# Patient Record
Sex: MALE | Race: WHITE | NOT HISPANIC OR LATINO | Employment: FULL TIME | URBAN - NONMETROPOLITAN AREA
[De-identification: names, ages, dates, MRNs, and addresses within clinical notes are randomized per-mention and may not be internally consistent; named-entity substitution may affect disease eponyms.]

---

## 2022-12-29 ENCOUNTER — HOSPITAL ENCOUNTER (EMERGENCY)
Facility: HOSPITAL | Age: 27
Discharge: HOME | End: 2022-12-29
Attending: EMERGENCY MEDICINE | Admitting: EMERGENCY MEDICINE
Payer: COMMERCIAL

## 2022-12-29 VITALS
HEIGHT: 72 IN | WEIGHT: 270 LBS | BODY MASS INDEX: 36.57 KG/M2 | DIASTOLIC BLOOD PRESSURE: 93 MMHG | RESPIRATION RATE: 18 BRPM | OXYGEN SATURATION: 96 % | TEMPERATURE: 98.6 F | SYSTOLIC BLOOD PRESSURE: 150 MMHG | HEART RATE: 105 BPM

## 2022-12-29 DIAGNOSIS — R09.81 NASAL CONGESTION: ICD-10-CM

## 2022-12-29 DIAGNOSIS — R05.9 COUGH, UNSPECIFIED TYPE: Primary | ICD-10-CM

## 2022-12-29 DIAGNOSIS — J34.89 RHINORRHEA: ICD-10-CM

## 2022-12-29 LAB
FLUAV RNA RESP QL NAA+PROBE: NOT DETECTED
FLUBV RNA RESP QL NAA+PROBE: NOT DETECTED
S PYO AG THROAT QL: NEGATIVE
SARS-COV-2 RNA RESP QL NAA+PROBE: NOT DETECTED

## 2022-12-29 PROCEDURE — 87081 CULTURE SCREEN ONLY: CPT

## 2022-12-29 PROCEDURE — 87880 STREP A ASSAY W/OPTIC: CPT

## 2022-12-29 PROCEDURE — 87636 SARSCOV2 & INF A&B AMP PRB: CPT | Performed by: EMERGENCY MEDICINE

## 2022-12-29 PROCEDURE — 99283 EMERGENCY DEPT VISIT LOW MDM: CPT

## 2022-12-29 RX ORDER — BENZONATATE 100 MG/1
100 CAPSULE ORAL 3 TIMES DAILY PRN
Qty: 15 CAPSULE | Refills: 0 | Status: SHIPPED | OUTPATIENT
Start: 2022-12-29

## 2022-12-29 RX ORDER — ACETAMINOPHEN 325 MG/1
975 TABLET ORAL ONCE
Status: COMPLETED | OUTPATIENT
Start: 2022-12-29 | End: 2022-12-29

## 2022-12-29 RX ORDER — FLUTICASONE PROPIONATE 50 MCG
2 SPRAY, SUSPENSION (ML) NASAL DAILY
Qty: 9.9 ML | Refills: 0 | Status: SHIPPED | OUTPATIENT
Start: 2022-12-29 | End: 2022-12-29 | Stop reason: SDUPTHER

## 2022-12-29 RX ORDER — FLUTICASONE PROPIONATE 50 MCG
2 SPRAY, SUSPENSION (ML) NASAL DAILY
Qty: 9.9 ML | Refills: 0 | Status: SHIPPED | OUTPATIENT
Start: 2022-12-29

## 2022-12-29 RX ORDER — BENZONATATE 100 MG/1
100 CAPSULE ORAL 3 TIMES DAILY PRN
Qty: 15 CAPSULE | Refills: 0 | Status: SHIPPED | OUTPATIENT
Start: 2022-12-29 | End: 2022-12-29 | Stop reason: SDUPTHER

## 2022-12-29 RX ADMIN — ACETAMINOPHEN 975 MG: 325 TABLET, FILM COATED ORAL at 14:31

## 2022-12-29 NOTE — ED PROVIDER NOTES
Subjective  History of Present Illness:    Chief Complaint: Flu symptoms  History of Present Illness: This is a 27-year-old male who presents emergency room today with chief complaint of flulike symptoms.  He reports cough that is dry in nature.  Congestion, runny nose, sore throat, mild headaches.  He reports he still has a mild headache at this time generalized to the lateral sides of his head, however not worse headache of his life and has been gradual onset nature.  Nonthunderclap-like in nature.  He additionally reports some decreased appetite.  He is maintaining good urinary output.  He additionally reports some right ear congestion and feels like there is some fluid in there.  His symptoms started on 25 December, he has not taken any medications such as Mucinex for improvement of symptoms.  He did take some ibuprofen at [place].  No known sick exposures.  Not vaccinated for flu or COVID.  Has not seen an urgent care or primary care prior to arrival here.        Nurses Notes reviewed and agree, including vitals, allergies, social history and prior medical history.     REVIEW OF SYSTEMS: All systems reviewed and not pertinent unless noted.    Review of Systems   Constitutional: Positive for fever.   HENT: Positive for congestion, rhinorrhea and sore throat.         Ear congestion in the right ear   Respiratory: Positive for cough. Negative for shortness of breath.    Gastrointestinal: Negative for nausea and vomiting.   Neurological: Positive for headaches.   All other systems reviewed and are negative.      Past Medical History:   Diagnosis Date   • Asthma        Allergies:    Patient has no known allergies.      History reviewed. No pertinent surgical history.      Social History     Socioeconomic History   • Marital status: Single   Tobacco Use   • Smoking status: Never     Passive exposure: Never   • Smokeless tobacco: Never   Vaping Use   • Vaping Use: Never used   Substance and Sexual Activity   •  Alcohol use: Not Currently   • Drug use: Never   • Sexual activity: Defer         History reviewed. No pertinent family history.    Objective  Physical Exam:  /93 (BP Location: Left arm, Patient Position: Sitting)   Pulse 105   Temp 98.6 °F (37 °C) (Oral)   Resp 18   Ht 182.9 cm (72\")   Wt 122 kg (270 lb)   SpO2 96%   BMI 36.62 kg/m²      Physical Exam  Constitutional:       General: He is not in acute distress.     Appearance: Normal appearance. He is normal weight. He is not ill-appearing, toxic-appearing or diaphoretic.   HENT:      Head: Normocephalic and atraumatic.      Right Ear: Ear canal and external ear normal. There is impacted cerumen.      Left Ear: Ear canal and external ear normal. There is impacted cerumen.      Ears:      Comments: From what I can visualize of the right tympanic membrane there appears to be some fluid behind the ear drum, nonbulging nonerythematous.  Left TM from what can be visualized also nonerythematous and nonbulging.     Nose: Congestion present. No rhinorrhea.      Mouth/Throat:      Pharynx: Oropharynx is clear. Posterior oropharyngeal erythema present.      Comments: Mild posterior oropharynx erythema, small white specks on right tonsil.  Uvula is midline.  No concern for peritonsillar abscess.  Tonsils are normal in size.  Eyes:      General:         Right eye: No discharge.         Left eye: No discharge.      Extraocular Movements: Extraocular movements intact.      Conjunctiva/sclera: Conjunctivae normal.   Cardiovascular:      Rate and Rhythm: Normal rate and regular rhythm.      Pulses: Normal pulses.      Heart sounds: Normal heart sounds.   Pulmonary:      Effort: Pulmonary effort is normal. No respiratory distress.      Breath sounds: Normal breath sounds. No stridor. No wheezing, rhonchi or rales.   Abdominal:      General: There is no distension.      Palpations: Abdomen is soft.      Tenderness: There is no abdominal tenderness. There is no guarding.    Musculoskeletal:         General: No swelling. Normal range of motion.      Cervical back: Normal range of motion and neck supple.   Lymphadenopathy:      Cervical: No cervical adenopathy.   Skin:     General: Skin is warm and dry.      Capillary Refill: Capillary refill takes less than 2 seconds.      Coloration: Skin is not jaundiced or pale.   Neurological:      General: No focal deficit present.      Mental Status: He is alert and oriented to person, place, and time.   Psychiatric:         Mood and Affect: Mood normal.         Behavior: Behavior normal.         Thought Content: Thought content normal.         Judgment: Judgment normal.           Procedures    ED Course:    ED Course as of 12/29/22 1413   Thu Dec 29, 2022   1254 We will obtain a rapid strep, COVID, flu.  He is hemodynamically stable, afebrile.  Satting 96% on room air. [JR]   1325 COVID, flu, strep negative.  Strep will be cultured.  Highly suspicious for viral etiology of his symptoms.  He is hemodynamically stable, afebrile, safe for discharge home at this time.  Will return for any worsening of symptoms. [JR]      ED Course User Index  [JR] Jair Daley PA-C       Lab Results (last 24 hours)     Procedure Component Value Units Date/Time    COVID PRE-OP / PRE-PROCEDURE SCREENING ORDER (NO ISOLATION) - Swab, Nasopharynx [[id number]]  (Normal) Collected: 12/29/22 1259    Specimen: Swab from Nasopharynx Updated: 12/29/22 1350    Narrative:      The following orders were created for panel order COVID PRE-OP / PRE-PROCEDURE SCREENING ORDER (NO ISOLATION) - Swab, Nasopharynx.  Procedure                               Abnormality         Status                     ---------                               -----------         ------                     COVID-19 and FLU A/B PCR...[[id number]]  Normal              Final result                 Please view results for these tests on the individual orders.    COVID-19 and FLU A/B PCR - Swab, Nasopharynx  [[id number]]  (Normal) Collected: 12/29/22 1259    Specimen: Swab from Nasopharynx Updated: 12/29/22 1350     COVID19 Not Detected     Influenza A PCR Not Detected     Influenza B PCR Not Detected    Narrative:      Fact sheet for providers: https://www.fda.gov/media/185478/download    Fact sheet for patients: https://www.fda.gov/media/103413/download    Test performed by PCR.    Rapid Strep A Screen - Swab, Throat [[id number]]  (Normal) Collected: 12/29/22 1329    Specimen: Swab from Throat Updated: 12/29/22 1348     Strep A Ag Negative    Beta Strep Culture, Throat - Swab, Throat [[id number]] Collected: 12/29/22 1329    Specimen: Swab from Throat Updated: 12/29/22 1348           No radiology results from the last 24 hrs       MDM  Number of Diagnoses or Management Options     Amount and/or Complexity of Data Reviewed  Clinical lab tests: reviewed and ordered  Discuss the patient with other providers: yes    Risk of Complications, Morbidity, and/or Mortality  General comments: This is a 27-year-old male who presents emergency room today with chief complaint of flulike symptoms.  He reports cough that is dry in nature.  Congestion, runny nose, sore throat, mild headaches.  He reports he still has a mild headache at this time generalized to the lateral sides of his head, however not worse headache of his life and has been gradual onset nature.  Nonthunderclap-like in nature.  He additionally reports some decreased appetite.  He is maintaining good urinary output.  He additionally reports some right ear congestion and feels like there is some fluid in there.  His symptoms started on 25 December, he has not taken any medications such as Mucinex for improvement of symptoms.  He did take some ibuprofen at [place].  No known sick exposures.  Not vaccinated for flu or COVID.  Has not seen an urgent care or primary care prior to arrival here.    Obtained a COVID, flu swab and strep that were negative.  Patient is in no acute  distress, well-appearing.  Congestion present on physical exam, mild posterior pharynx erythema.  Lungs were clear to auscultation.  Patient was satting 96% on room air, nonhypoxic in no acute respiratory distress.  He did not report any shortness of air or chest pain.  With the confluence of his symptoms, I am highly suspicious for a viral etiology although his flu and COVID were negative of another nature such as rhinovirus etc.  Will discharge home with symptomatic treatment including Tessalon Perles for cough and Flonase for congestion.  Return precautions discussed.  He can be discharged home in good condition.  Given Tylenol for headache, not worst headache of his life, gradual onset, no signs of meningismus, non-thunderclap like in nature.  Patient is agreeable to plan and will return for any worsening symptoms.  Differentials considered include viral upper respiratory tract infection and strep throat.    Patient Progress  Patient progress: stable        Final diagnoses:   Cough, unspecified type   Nasal congestion   Rhinorrhea        Jair Daley PA-C  12/29/22 141

## 2022-12-29 NOTE — DISCHARGE INSTRUCTIONS
Return to emergency room for any worsening symptoms.  I have sent a prescription for Flonase and Tessalon Perles to your pharmacy to help with cough and congestion.  Make sure to drink plenty of fluids, get plenty of rest, Tylenol and ibuprofen for fevers if you develop.

## 2022-12-31 LAB — BACTERIA SPEC AEROBE CULT: NORMAL
